# Patient Record
Sex: FEMALE | Race: WHITE | NOT HISPANIC OR LATINO | Employment: STUDENT | URBAN - METROPOLITAN AREA
[De-identification: names, ages, dates, MRNs, and addresses within clinical notes are randomized per-mention and may not be internally consistent; named-entity substitution may affect disease eponyms.]

---

## 2020-09-04 ENCOUNTER — HOSPITAL ENCOUNTER (EMERGENCY)
Facility: HOSPITAL | Age: 20
Discharge: HOME | End: 2020-09-04
Attending: EMERGENCY MEDICINE | Admitting: EMERGENCY MEDICINE
Payer: COMMERCIAL

## 2020-09-04 VITALS
WEIGHT: 134 LBS | HEART RATE: 82 BPM | OXYGEN SATURATION: 99 % | SYSTOLIC BLOOD PRESSURE: 117 MMHG | TEMPERATURE: 98.9 F | RESPIRATION RATE: 16 BRPM | DIASTOLIC BLOOD PRESSURE: 75 MMHG

## 2020-09-04 DIAGNOSIS — N94.89 LABIAL PAIN: ICD-10-CM

## 2020-09-04 DIAGNOSIS — N94.89 LABIAL SWELLING: Primary | ICD-10-CM

## 2020-09-04 LAB
BACTERIA UR QL AUTO: ABNORMAL /HPF
BILIRUB UR QL STRIP: NEGATIVE
C TRACH DNA SPEC QL NAA+PROBE: NEGATIVE
CLARITY UR: CLEAR
COLOR UR: YELLOW
EXT PREG TEST URINE: NEGATIVE
EXT. CONTROL ED NAV: NORMAL
GLUCOSE UR STRIP-MCNC: NEGATIVE MG/DL
HGB UR QL STRIP.AUTO: ABNORMAL
HYALINE CASTS #/AREA URNS LPF: ABNORMAL /LPF
KETONES UR STRIP-MCNC: NEGATIVE MG/DL
LEUKOCYTE ESTERASE UR QL STRIP: ABNORMAL
N GONORRHOEA DNA SPEC QL NAA+PROBE: NEGATIVE
NITRITE UR QL STRIP: NEGATIVE
NON-SQ EPI CELLS URNS QL MICRO: ABNORMAL /HPF
PH UR STRIP.AUTO: 7 [PH] (ref 4.5–8)
PROT UR STRIP-MCNC: NEGATIVE MG/DL
RBC #/AREA URNS AUTO: ABNORMAL /HPF
SP GR UR STRIP.AUTO: 1.02 (ref 1–1.03)
UROBILINOGEN UR QL STRIP.AUTO: 1 E.U./DL
WBC #/AREA URNS AUTO: ABNORMAL /HPF

## 2020-09-04 PROCEDURE — 81025 URINE PREGNANCY TEST: CPT | Performed by: PHYSICIAN ASSISTANT

## 2020-09-04 PROCEDURE — 87147 CULTURE TYPE IMMUNOLOGIC: CPT

## 2020-09-04 PROCEDURE — 87591 N.GONORRHOEAE DNA AMP PROB: CPT | Performed by: PHYSICIAN ASSISTANT

## 2020-09-04 PROCEDURE — 81001 URINALYSIS AUTO W/SCOPE: CPT

## 2020-09-04 PROCEDURE — 87491 CHLMYD TRACH DNA AMP PROBE: CPT | Performed by: PHYSICIAN ASSISTANT

## 2020-09-04 PROCEDURE — 99282 EMERGENCY DEPT VISIT SF MDM: CPT | Performed by: PHYSICIAN ASSISTANT

## 2020-09-04 PROCEDURE — 87086 URINE CULTURE/COLONY COUNT: CPT

## 2020-09-04 PROCEDURE — 99283 EMERGENCY DEPT VISIT LOW MDM: CPT

## 2020-09-04 NOTE — ED PROVIDER NOTES
History  Chief Complaint   Patient presents with    Vaginal Swelling     Pt reports she had sex last night and this morning woke up with L labia swelling  Pt denies discharge  LOBITO WYLIE  is a 23year old female with no significant pmh who is presenting to the ED with left labial pain and swelling x 1 day  Patient reports she had sex with her boyfriend last night and reports his penis kept slipping out of her vagina and hitting her left labia  This morning she woke up with purple bruising and swelling to the left labia  Patient reports some mild discomfort in the area  Patient denies vaginal pain, rather states she is having some localized discomfort in the left labia  Patient reports that she does not always use condoms when she has sex with her boyfriend  Patient denies history of STIs, fevers, chills, abdominal pain, pelvic pain, nausea, vomiting, diarrhea, dysuria, urinary frequency, urinary urgency and vaginal discharge  She also denies headaches, dizziness shortness of breath and chest pain  Patient reports some vaginal bleeding because her menstrual period just began  Patient did not take any medication for her symptoms and is not requesting any at this time  None       History reviewed  No pertinent past medical history  History reviewed  No pertinent surgical history  History reviewed  No pertinent family history  I have reviewed and agree with the history as documented  E-Cigarette/Vaping     E-Cigarette/Vaping Substances     Social History     Tobacco Use    Smoking status: Never Smoker    Smokeless tobacco: Never Used   Substance Use Topics    Alcohol use: Yes     Frequency: 2-3 times a week     Binge frequency: Weekly    Drug use: Yes     Types: Marijuana       Review of Systems   Constitutional: Negative for chills and fever  HENT: Negative for congestion and sore throat  Eyes: Negative for photophobia and visual disturbance     Respiratory: Negative for cough and shortness of breath  Cardiovascular: Negative for chest pain and leg swelling  Gastrointestinal: Negative for abdominal pain, diarrhea, nausea and vomiting  Genitourinary: Negative for difficulty urinating, dysuria, flank pain, frequency, genital sores, pelvic pain, vaginal discharge and vaginal pain  Musculoskeletal: Negative for back pain, neck pain and neck stiffness  Skin: Positive for color change  Negative for rash  Neurological: Negative for dizziness, light-headedness, numbness and headaches  Physical Exam  Physical Exam  Vitals signs and nursing note reviewed  Exam conducted with a chaperone present  Constitutional:       General: She is not in acute distress  Appearance: Normal appearance  She is normal weight  She is not ill-appearing or toxic-appearing  Comments: Well-appearing female lying on exam table in no acute distress  HENT:      Head: Normocephalic and atraumatic  Right Ear: External ear normal       Left Ear: External ear normal    Eyes:      General:         Right eye: No discharge  Left eye: No discharge  Extraocular Movements: Extraocular movements intact  Conjunctiva/sclera: Conjunctivae normal    Neck:      Musculoskeletal: Normal range of motion and neck supple  Cardiovascular:      Rate and Rhythm: Normal rate and regular rhythm  Pulses: Normal pulses  Heart sounds: Normal heart sounds  No murmur  No friction rub  No gallop  Pulmonary:      Effort: Pulmonary effort is normal  No respiratory distress  Breath sounds: Normal breath sounds  No wheezing, rhonchi or rales  Abdominal:      General: Bowel sounds are normal       Palpations: Abdomen is soft  Tenderness: There is no abdominal tenderness  Comments: Abdomen nontender to palpation  Genitourinary:     Labia:         Left: Tenderness present  Vagina: No vaginal discharge  Comments: Swelling and purple bruising to left labia     Patient declined internal exam   Musculoskeletal: Normal range of motion  Skin:     General: Skin is warm and dry  Capillary Refill: Capillary refill takes less than 2 seconds  Neurological:      General: No focal deficit present  Mental Status: She is alert and oriented to person, place, and time  Psychiatric:         Mood and Affect: Mood normal          Behavior: Behavior normal          Thought Content: Thought content normal          Judgment: Judgment normal          Vital Signs  ED Triage Vitals   Temperature Pulse Respirations Blood Pressure SpO2   09/04/20 1107 09/04/20 1059 09/04/20 1107 09/04/20 1107 09/04/20 1059   98 9 °F (37 2 °C) 89 14 117/75 98 %      Temp Source Heart Rate Source Patient Position - Orthostatic VS BP Location FiO2 (%)   09/04/20 1107 09/04/20 1107 -- -- --   Oral Monitor         Pain Score       09/04/20 1107       3           Vitals:    09/04/20 1059 09/04/20 1107 09/04/20 1115   BP:  117/75 117/75   Pulse: 89 80 82         Visual Acuity      ED Medications  Medications - No data to display    Diagnostic Studies  Results Reviewed     Procedure Component Value Units Date/Time    Urine Microscopic [[id number]] Collected:  09/04/20 1129    Lab Status: In process Specimen:  Urine, Clean Catch Updated:  09/04/20 1136    Chlamydia/GC amplified DNA by PCR [[id number]] Collected:  09/04/20 1127    Lab Status:   In process Specimen:  Urine, Other Updated:  09/04/20 1136    POCT pregnancy, urine [[id number]]  (Normal) Resulted:  09/04/20 1133    Lab Status:  Final result Updated:  09/04/20 1133     EXT PREG TEST UR (Ref: Negative) negative     Control valid    Urine Macroscopic, POC [[id number]]  (Abnormal) Collected:  09/04/20 1129    Lab Status:  Final result Specimen:  Urine Updated:  09/04/20 1130     Color, UA Yellow     Clarity, UA Clear     pH, UA 7 0     Leukocytes, UA Small     Nitrite, UA Negative     Protein, UA Negative mg/dl      Glucose, UA Negative mg/dl      Ketones, UA Negative mg/dl      Urobilinogen, UA 1 0 E U /dl      Bilirubin, UA Negative     Blood, UA Large     Specific Gravity, UA 1 025    Narrative:       [facility] RESULT                 No orders to display              Procedures  Procedures         ED Course                                             MDM  Number of Diagnoses or Management Options  Labial pain:   Labial swelling:   Diagnosis management comments:   -Negative urine preg  Urinalysis positive for blood  Patient states she is beginning her menstrual cycle  GC/chlamydia test pending   -Patient declined internal exam   Patient declined pain medication   -Instructed patient follow-up with Gynecology this week  Instructed her to apply ice/cool compresses to the area and take anti-inflammatories as needed   -The management plan was discussed in detail with the patient at bedside and all questions were answered  Prior to discharge, she was provided both verbal and written instructions  Discussed signs and symptoms for which to return to the emergency department  All questions were answered and patient was comfortable with the plan of care and discharged to home  Instructed the patient to follow up with gynecology as provided in her written instructions  Patient verbalized understanding of our discussion and plan of care, and agrees to return to the Emergency Department for concerns and progression of illness           Amount and/or Complexity of Data Reviewed  Clinical lab tests: ordered and reviewed          Disposition  Final diagnoses:   Labial swelling   Labial pain     Time reflects when diagnosis was documented in both MDM as applicable and the Disposition within this note     Time User Action Codes Description Comment    9/4/2020 11:39 AM Guidoia Iron Add [N94 89] Labial swelling     9/4/2020 11:40 AM Alfredia Iron Add [N94 89] Labial pain       ED Disposition     ED Disposition Condition Date/Time Comment    Discharge Stable Fri Sep 4, 2020 11:39 AM Rekha Marshall discharge to home/self care  Follow-up Information     Follow up With Specialties Details Why Contact Info Additional Information    [facility] In 3 days  [address]  [id number]  [phone] [address] [facility] [place], [address] [place] [address], [id number] [phone]          Patient's Medications    No medications on file     No discharge procedures on file      PDMP Review     None          ED Provider  Electronically Signed by           Lula Mendez PA-C  09/04/20 1141

## 2020-09-04 NOTE — DISCHARGE INSTRUCTIONS
Apply ice to area as needed for comfort  Take anti-inflammatories as needed for pain/swelling  Follow-up with Gynecology this week  Return to the emergency department if you develop worsening symptoms such as fevers, severe abdominal pain or pelvic pain, vaginal bleeding or vaginal discharge

## 2020-09-05 LAB
BACTERIA UR CULT: ABNORMAL
BACTERIA UR CULT: ABNORMAL